# Patient Record
Sex: FEMALE | Race: WHITE | NOT HISPANIC OR LATINO | Employment: STUDENT | URBAN - METROPOLITAN AREA
[De-identification: names, ages, dates, MRNs, and addresses within clinical notes are randomized per-mention and may not be internally consistent; named-entity substitution may affect disease eponyms.]

---
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
## 2021-01-21 ENCOUNTER — TRANSFERRED RECORDS (OUTPATIENT)
Dept: HEALTH INFORMATION MANAGEMENT | Facility: CLINIC | Age: 21
End: 2021-01-21

## 2021-01-27 NOTE — TELEPHONE ENCOUNTER
Called patient.  She missed her  to  her prescription.  I informed the patient that she will need another pregnancy test before we can send her another prescription.      I scheduled her for tomorrow in the lab.  I will have an order placed for her by an RN.    Kathya Munoz, CMA     Yes
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
## 2021-04-23 ENCOUNTER — TELEPHONE (OUTPATIENT)
Dept: FAMILY MEDICINE | Facility: CLINIC | Age: 21
End: 2021-04-23

## 2021-04-23 ENCOUNTER — E-VISIT (OUTPATIENT)
Dept: FAMILY MEDICINE | Facility: CLINIC | Age: 21
End: 2021-04-23
Payer: COMMERCIAL

## 2021-04-23 DIAGNOSIS — J45.20 MILD INTERMITTENT ASTHMA WITHOUT COMPLICATION: ICD-10-CM

## 2021-04-23 PROCEDURE — 99421 OL DIG E/M SVC 5-10 MIN: CPT | Performed by: FAMILY MEDICINE

## 2021-04-23 RX ORDER — ALBUTEROL SULFATE 90 UG/1
2 AEROSOL, METERED RESPIRATORY (INHALATION) EVERY 4 HOURS PRN
Qty: 18 G | Refills: 1 | Status: SHIPPED | OUTPATIENT
Start: 2021-04-23 | End: 2021-06-09

## 2021-04-23 NOTE — TELEPHONE ENCOUNTER
Patient calling for a refill for her albuterol inhaler.  There were no refills left at the pharmacy.    Patient has not been seen by primary care since 11/25/2019    Patient needs inhaler for this time of year with the pollen in the air.  She has some wheezing in the morning, but no cough or SOB, she does not feel ill.    She is now living in [place], [place].  She is going to call the [facility] clinic for an appointment.  If she worsens, has difficulty breathing, SOB, she will go to the ED.    Griselda Santana RN, [facility]

## 2021-04-24 ASSESSMENT — ASTHMA QUESTIONNAIRES: ACT_TOTALSCORE: 18

## 2021-05-19 ENCOUNTER — TELEPHONE (OUTPATIENT)
Dept: FAMILY MEDICINE | Facility: CLINIC | Age: 21
End: 2021-05-19

## 2021-05-19 NOTE — TELEPHONE ENCOUNTER
Patient Quality Outreach      Summary:    Patient has the following on her problem list/HM:     Asthma review       ACT Total Scores 4/23/2021   ACT TOTAL SCORE (Goal Greater than or Equal to 20) 18   In the past 12 months, how many times did you visit the emergency room for your asthma without being admitted to the hospital? 0   In the past 12 months, how many times were you hospitalized overnight because of your asthma? 0          Patient is due/failing the following:   ACT needed    Type of outreach:    Sent BitPoster message.    Questions for provider review:    None                                                                                                                                     Bobbi Boucher

## 2021-06-07 DIAGNOSIS — J45.20 MILD INTERMITTENT ASTHMA WITHOUT COMPLICATION: ICD-10-CM

## 2021-06-07 NOTE — TELEPHONE ENCOUNTER
Albuterol refill request  ACT Total Scores 4/26/2019 4/23/2021   ACT TOTAL SCORE (Goal Greater than or Equal to 20) 23 18   In the past 12 months, how many times did you visit the emergency room for your asthma without being admitted to the hospital? 0 0   In the past 12 months, how many times were you hospitalized overnight because of your asthma? 0 0     EVISIT: 4/23/21  Prescription refilled x2.  Left message for patient to call.  Needing to know if she's already used both prescription's and needing refill on the albuterol.  [phone].  Shira Hopkins RN

## 2021-06-09 RX ORDER — ALBUTEROL SULFATE 90 UG/1
2 AEROSOL, METERED RESPIRATORY (INHALATION) EVERY 4 HOURS PRN
Qty: 18 G | Refills: 1 | Status: SHIPPED | OUTPATIENT
Start: 2021-06-09 | End: 2022-11-09

## 2021-06-09 NOTE — TELEPHONE ENCOUNTER
Pt states she is out of inhalers. She used both of them.  She states she had covid.  She said one of them was broken so she had to fill the second one.  Carrol Simmons BSN, RN

## 2021-06-12 ENCOUNTER — HEALTH MAINTENANCE LETTER (OUTPATIENT)
Age: 21
End: 2021-06-12

## 2021-10-02 ENCOUNTER — HEALTH MAINTENANCE LETTER (OUTPATIENT)
Age: 21
End: 2021-10-02

## 2022-04-06 ENCOUNTER — TELEPHONE (OUTPATIENT)
Dept: FAMILY MEDICINE | Facility: CLINIC | Age: 22
End: 2022-04-06

## 2022-07-09 ENCOUNTER — HEALTH MAINTENANCE LETTER (OUTPATIENT)
Age: 22
End: 2022-07-09

## 2022-09-03 ENCOUNTER — HEALTH MAINTENANCE LETTER (OUTPATIENT)
Age: 22
End: 2022-09-03

## 2023-07-22 ENCOUNTER — HEALTH MAINTENANCE LETTER (OUTPATIENT)
Age: 23
End: 2023-07-22

## 2024-02-07 NOTE — MR AVS SNAPSHOT
"              After Visit Summary   9/25/2017    Omayra Law    MRN: [id number]           Patient Information     Date Of Birth          2000        Visit Information        Provider Department      9/25/2017 12:20 PM aMdonna Lyons MD [employer]        Today's Diagnoses     Upper respiratory tract infection, unspecified type    -  1       Follow-ups after your visit        Follow-up notes from your care team     Return if symptoms worsen or fail to improve.      Who to contact     If you have questions or need follow up information about today's clinic visit or your schedule please contact [facility] directly at [phone].  Normal or non-critical lab and imaging results will be communicated to you by MyChart, letter or phone within 4 business days after the clinic has received the results. If you do not hear from us within 7 days, please contact the clinic through [employer] or phone. If you have a critical or abnormal lab result, we will notify you by phone as soon as possible.  Submit refill requests through [employer] or call your pharmacy and they will forward the refill request to us. Please allow 3 business days for your refill to be completed.          Additional Information About Your Visit        MyChart Information     [employer] lets you send messages to your doctor, view your test results, renew your prescriptions, schedule appointments and more. To sign up, go to [email or web address], contact your [place] clinic or call [phone] during business hours.            Care EveryWhere ID     This is your Care EveryWhere ID. This could be used by other organizations to access your [place] medical records  Opted out of Care Everywhere exchange        Your Vitals Were     Pulse Temperature Respirations Height Pulse Oximetry BMI (Body Mass Index)    86 97.9  F (36.6  C) (Oral) 14 1.657 m (5' 5.25\") 98% 37.16 kg/m2       Blood Pressure from Last 3 Encounters: "   09/25/17 126/82   06/13/17 117/69   05/28/17 130/87    Weight from Last 3 Encounters:   09/25/17 102.1 kg (225 lb) (>99 %)*   06/13/17 102.6 kg (226 lb 3.2 oz) (>99 %)*   05/27/17 102.8 kg (226 lb 9.6 oz) (>99 %)*     * Growth percentiles are based on [facility] 2-20 Years data.              Today, you had the following     No orders found for display         Today's Medication Changes          These changes are accurate as of: 9/25/17 12:35 PM.  If you have any questions, ask your nurse or doctor.               Start taking these medicines.        Dose/Directions    amoxicillin 875 MG tablet   Commonly known as:  AMOXIL   Used for:  Upper respiratory tract infection, unspecified type   Started by:  Madonna Lyons MD        Dose:  875 mg   Take 1 tablet (875 mg) by mouth 2 times daily   Quantity:  20 tablet   Refills:  0         These medicines have changed or have updated prescriptions.        Dose/Directions    * PROAIR  (90 BASE) MCG/ACT Inhaler   This may have changed:  Another medication with the same name was added. Make sure you understand how and when to take each.   Generic drug:  albuterol   Changed by:  Rylee Juarez MD        Dose:  2 puff   Inhale 2 puffs into the lungs every 6 hours as needed.   Refills:  0       * albuterol 108 (90 BASE) MCG/ACT Inhaler   Commonly known as:  PROAIR HFA/PROVENTIL HFA/VENTOLIN HFA   This may have changed:  You were already taking a medication with the same name, and this prescription was added. Make sure you understand how and when to take each.   Used for:  Upper respiratory tract infection, unspecified type   Changed by:  Madonna Lyons MD        Dose:  2 puff   Inhale 2 puffs into the lungs every 4 hours as needed (cough or wheeze)   Quantity:  1 Inhaler   Refills:  0       * Notice:  This list has 2 medication(s) that are the same as other medications prescribed for you. Read the directions carefully, and ask your doctor or other care provider to  review them with you.         Where to get your medicines      These medications were sent to [facility]/pharmacy #[id number] - [facility] [address] [place] AT [address] OF [facility]  [address] [facility] [address]     Phone:  [phone]     amoxicillin 875 MG tablet         Some of these will need a paper prescription and others can be bought over the counter.  Ask your nurse if you have questions.     Bring a paper prescription for each of these medications     albuterol 108 (90 BASE) MCG/ACT Inhaler                Primary Care Provider Office Phone #    [facility] [phone]       No address on file        Equal Access to Services     BRODERICK HIGGINS : Hadii romel Lerner, waaxda luhankadaha, qaybta kaalmada anant, francisca hargrove. So [facility] [phone].    ATENCIÓN: Si habla español, tiene a morgan disposición servicios gratuitos de asistencia lingüística. [facility] [phone].    We comply with applicable federal civil rights laws and [place] laws. We do not discriminate on the basis of race, color, national origin, age, disability sex, sexual orientation or gender identity.            Thank you!     Thank you for choosing [facility]  for your care. Our goal is always to provide you with excellent care. Hearing back from our patients is one way we can continue to improve our services. Please take a few minutes to complete the written survey that you may receive in the mail after your visit with us. Thank you!             Your Updated Medication List - Protect others around you: Learn how to safely use, store and throw away your medicines at www.disposemymeds.org.          This list is accurate as of: 9/25/17 12:35 PM.  Always use your most recent med list.                   Brand Name Dispense Instructions for use Diagnosis    amoxicillin 875 MG tablet    AMOXIL    20 tablet    Take 1 tablet (875 mg) by mouth 2 times  daily    Upper respiratory tract infection, unspecified type       buPROPion 150 MG 24 hr tablet    WELLBUTRIN XL    90 tablet    Take 3 tablets (450 mg) by mouth daily    Major depressive disorder, recurrent episode, moderate (H)       Cholecalciferol 4000 UNITS Tabs     30 tablet    Take 4,000 Units by mouth daily    Vitamin D insufficiency       CYANOCOBALAMIN-METHYLCOBALAMIN SL      Take 1 tablet by mouth daily        escitalopram 10 MG tablet    LEXAPRO    90 tablet    Take 3 tablets (30 mg) by mouth daily    Major depressive disorder, recurrent episode, moderate (H)       fish oil-omega-3 fatty acids 1000 MG capsule      Take 1 capsule (1 g) by mouth 2 times daily    Major depressive disorder, recurrent episode, moderate (H), Other reactions to severe stress       HYDROXYZINE HCL PO      Take 25 mg by mouth every 6 hours as needed for other (for anxiety)        IBUPROFEN PO      Take 400 mg by mouth as needed for moderate pain (post nasal septal repair)        melatonin 3 MG tablet     30 tablet    Take 1 tablet (3 mg) by mouth nightly as needed    Major depressive disorder, recurrent episode, moderate (H)       prazosin 2 MG capsule    MINIPRESS    60 capsule    Take 2 capsules (4 mg) by mouth At Bedtime    Major depressive disorder, recurrent episode, moderate (H)       * PROAIR  (90 BASE) MCG/ACT Inhaler   Generic drug:  albuterol      Inhale 2 puffs into the lungs every 6 hours as needed.        * albuterol 108 (90 BASE) MCG/ACT Inhaler    PROAIR HFA/PROVENTIL HFA/VENTOLIN HFA    1 Inhaler    Inhale 2 puffs into the lungs every 4 hours as needed (cough or wheeze)    Upper respiratory tract infection, unspecified type       * Notice:  This list has 2 medication(s) that are the same as other medications prescribed for you. Read the directions carefully, and ask your doctor or other care provider to review them with you.       Detail Level: Detailed Depth Of Biopsy: dermis Was A Bandage Applied: Yes Size Of Lesion In Cm: 0.5 X Size Of Lesion In Cm: 0.2 Biopsy Type: H and E Biopsy Method: Dermablade Anesthesia Type: 1% lidocaine with epinephrine Additional Anesthesia Volume In Cc (Will Not Render If 0): 0 Hemostasis: Drysol Wound Care: Petrolatum Dressing: bandage Destruction After The Procedure: No Type Of Destruction Used: Curettage Curettage Text: The wound bed was treated with curettage after the biopsy was performed. Cryotherapy Text: The wound bed was treated with cryotherapy after the biopsy was performed. Electrodesiccation Text: The wound bed was treated with electrodesiccation after the biopsy was performed. Electrodesiccation And Curettage Text: The wound bed was treated with electrodesiccation and curettage after the biopsy was performed. Silver Nitrate Text: The wound bed was treated with silver nitrate after the biopsy was performed. Lab: [id number] Consent: Written consent was obtained and risks were reviewed including but not limited to scarring, infection, bleeding, scabbing, incomplete removal, nerve damage and allergy to anesthesia. Post-Care Instructions: I reviewed with the patient in detail post-care instructions. Patient is to keep the biopsy site dry overnight, and then apply bacitracin twice daily until healed. Patient may apply hydrogen peroxide soaks to remove any crusting. Notification Instructions: Patient will be notified of biopsy results. However, patient instructed to call the office if not contacted within 2 weeks. Billing Type: Third-Party Bill Information: Selecting Yes will display possible errors in your note based on the variables you have selected. This validation is only offered as a suggestion for you. PLEASE NOTE THAT THE VALIDATION TEXT WILL BE REMOVED WHEN YOU FINALIZE YOUR NOTE. IF YOU WANT TO FAX A PRELIMINARY NOTE YOU WILL NEED TO TOGGLE THIS TO 'NO' IF YOU DO NOT WANT IT IN YOUR FAXED NOTE.

## 2024-03-22 NOTE — PROGRESS NOTES
05/20/17 1500   Therapeutic Recreation   Type of Intervention structured groups   Activity leisure education   Response Participates, initiates socially appropriate   Hours 1   Treatment Detail Anti-stress putty    Patients worked in groups to make their own anti-stress putty. Patient was a happy participant during group today. Patient participated in the activity and worked with group members.    normal...

## 2024-05-13 NOTE — PROGRESS NOTES
05/21/17 1300   Psycho Education   Type of Intervention structured groups   Response participates, initiates socially appropriate   Hours 1   Treatment Detail Recreation     Pt was a passive participant. Pt was socially appropriate.   No